# Patient Record
Sex: FEMALE | Race: WHITE
[De-identification: names, ages, dates, MRNs, and addresses within clinical notes are randomized per-mention and may not be internally consistent; named-entity substitution may affect disease eponyms.]

---

## 2017-07-31 ENCOUNTER — HOSPITAL ENCOUNTER (OUTPATIENT)
Dept: HOSPITAL 39 - GMAM | Age: 62
Discharge: HOME | End: 2017-07-31
Attending: FAMILY MEDICINE
Payer: COMMERCIAL

## 2017-07-31 DIAGNOSIS — R82.99: ICD-10-CM

## 2017-07-31 DIAGNOSIS — R82.5: Primary | ICD-10-CM

## 2017-08-03 ENCOUNTER — HOSPITAL ENCOUNTER (OUTPATIENT)
Dept: HOSPITAL 39 - CT | Age: 62
End: 2017-08-03
Attending: FAMILY MEDICINE
Payer: COMMERCIAL

## 2017-08-03 DIAGNOSIS — R05: Primary | ICD-10-CM

## 2017-08-04 NOTE — CT
EXAM DESCRIPTION:



Chest w/Contrast



CLINICAL HISTORY:



62 years,Female,COUGH



COMPARISON:



None





TECHNIQUE:



Multiple axial helical tomographic images were obtained of the

chest with IV contrast, then reconstructed in the sagittal and

coronal plane.   This exam was performed using radiation doses

that are As Low As Reasonably Achievable (ALARA). 







FINDINGS:



Lung fields are clear. No consolidations or effusions or nodules

except a 4 mm calcified granuloma in the right base.  No

pneumothoraces.



Mediastinum demonstrates no adenopathy or masses.



Heart size and pulmonary vascularity are unremarkable.



Upper abdominal organs included in the exam demonstrate an

ill-defined hyper remake region seen in the right anterior dome

of the liver measuring 1.4 cm about in size.



Soft tissues and bony elements are unremarkable.









IMPRESSION:



Unremarkable CT of the chest.  There is however a small

ill-defined area of enhancement changes seen in the right dome of

the liver. This could be an hemangioma but recommend further

evaluation with the CT scan of the liver with and without IV

contrast but with delayed images.









Electronically signed by:  Candelario Moctezuma MD  8/4/2017 9:04 AM CDT

Workstation: [id number]

## 2017-08-14 ENCOUNTER — HOSPITAL ENCOUNTER (OUTPATIENT)
Dept: HOSPITAL 39 - GMAM | Age: 62
Discharge: HOME | End: 2017-08-14
Attending: FAMILY MEDICINE
Payer: COMMERCIAL

## 2017-08-14 DIAGNOSIS — R82.99: Primary | ICD-10-CM

## 2017-09-05 ENCOUNTER — HOSPITAL ENCOUNTER (OUTPATIENT)
Dept: HOSPITAL 39 - CT | Age: 62
Discharge: HOME | End: 2017-09-05
Attending: FAMILY MEDICINE
Payer: COMMERCIAL

## 2017-09-05 DIAGNOSIS — K76.0: ICD-10-CM

## 2017-09-05 DIAGNOSIS — R93.8: ICD-10-CM

## 2017-09-05 DIAGNOSIS — R82.5: Primary | ICD-10-CM

## 2017-09-05 DIAGNOSIS — K57.30: ICD-10-CM

## 2017-09-05 NOTE — CT
EXAM DESCRIPTION: 



CT ABDOMEN AND PELVIS WITHOUT AND WITH CONTRAST



CLINICAL HISTORY: 



NONSPECIFIC FINDINGS ON EXAM OF URINE



COMPARISON: 



None Available.



TECHNIQUE: 



CT of the abdomen and pelvis are performed prior to and during IV

bolus administration of nonionic IV contrast. Oral contrast

enhancement was not utilized

This exam was performed according to our departmental

dose-optimization program, which includes automated exposure

control, adjustment of the mA and/or kV according to patient size

and/or use of iterative reconstruction technique.



FINDINGS: 

The lung bases demonstrate a tiny 3 mm calcified granuloma in the

posterior right costophrenic angle and mild scarring anteriorly.



Mild diffuse fatty replacement of the liver is present. There is

a tiny estimated less than 1 cm faintly enhancing lesion in the

lateral dome of the right lobe of the liver that is most

suspicious for a small hemangioma. Correlation with hepatic

sonography for further evaluation is recommended. The gallbladder

is normally distended without ductal dilation and a small normal

spleen is present. The pancreas normally enhances. The adrenal

glands are normal.



The kidneys normally enhance without obstruction cyst or mass.

Delayed imaging demonstrates a single collecting system on each

side with contrast distending the bladder. Vena cava and aorta

are normal without retroperitoneal or mesenteric abnormal

adenopathy



Small and large bowel caliber is normal. Diverticulosis of the

left colon is present. The uterus is anteverted without pelvic

fluid collection or adnexal mass. The bladder is incompletely

distended. The anterior abdominal wall and inguinal regions are

normal. The spine and pelvis are unremarkable.



IMPRESSION: 



1.  Very subtle subcentimeter faint enhancement at the lateral

dome of the right lobe of the liver suspicious for a small

hemangioma. Consider hepatic sonography for further evaluation.

2.  Mild diffuse fatty infiltration of the liver.

3.  Left colonic diverticulosis.

4.  Tiny calcified granuloma right posterior lung base.

5.  Incompletely distended bladder with no gross abnormality

seen.



Electronically signed by:  John Hutson MD  9/5/2017 11:46 AM

CDT Workstation: [id number]

## 2017-10-02 ENCOUNTER — HOSPITAL ENCOUNTER (OUTPATIENT)
Dept: HOSPITAL 39 - US | Age: 62
Discharge: HOME | End: 2017-10-02
Attending: FAMILY MEDICINE
Payer: COMMERCIAL

## 2017-10-02 DIAGNOSIS — K70.0: Primary | ICD-10-CM

## 2017-10-02 NOTE — US
EXAM DESCRIPTION: 

Liver, and ultrasound.



CLINICAL HISTORY: 

FATTY LIVER



COMPARISON: 

CT abdomen and pelvis 2017.



TECHNIQUE: 

Transabdominal scannin-dimensional and Doppler modes.



FINDINGS: 

The gallbladder is normal in size, shape, and echogenicity, with

no intraluminal stones or sludge. Prominent fold in the

gallbladder. No fluid around the gallbladder. No wall thickening.

1.8 mm. Common bile duct caliber is 3.4 mm which is within normal

limits. . No stones in the visualized portion of the duct. Not

tender with transducer pressure.



The liver demonstrates normal echogenicity; contour of the liver

capsule is smooth where seen. No fluid around the liver.

Intrahepatic biliary ducts are non-dilated. Craniocaudal

dimension in the mid-clavicular axis is 13.6 cm. 



Pancreas head, body, tail normal in size and echogenicity.

Pancreatic duct is not dilated. 



IMPRESSION: 

1. Liver is not enlarged and demonstrates normal echogenicity.

Normal intrahepatic ducts. No ascites. Normal ultrasound of the

pancreas.

2. Gallbladder with prominent fold but no stones or sludge. No

wall thickening or pericholecystic fluid. Normal caliber of the

common bile duct.



Electronically signed by:  Sam Bell MD  10/2/2017 7:37 PM CDT

Workstation: [id number]

## 2017-12-04 ENCOUNTER — HOSPITAL ENCOUNTER (OUTPATIENT)
Dept: HOSPITAL 39 - RAD | Age: 62
End: 2017-12-04
Attending: OBSTETRICS & GYNECOLOGY
Payer: COMMERCIAL

## 2017-12-04 DIAGNOSIS — Z12.31: Primary | ICD-10-CM

## 2017-12-04 PROCEDURE — 77063 BREAST TOMOSYNTHESIS BI: CPT

## 2018-10-24 ENCOUNTER — HOSPITAL ENCOUNTER (OUTPATIENT)
Dept: HOSPITAL 39 - CT | Age: 63
End: 2018-10-24
Attending: FAMILY MEDICINE
Payer: COMMERCIAL

## 2018-10-24 DIAGNOSIS — Z87.891: Primary | ICD-10-CM

## 2018-10-24 DIAGNOSIS — R91.1: ICD-10-CM

## 2018-10-25 NOTE — CT
Procedure:  CT LUNG SCREENING        



Exam Date:  10/24/2018



Ordering Provider:  Grover Ambrosio



Clinical Indication:  TOBACCO USE over 40 years of smoking. Quit

smoking 3 months ago This patient meets eligibility criteria for

low-dose CT lung cancer screening.



Comparison: CT scan of chest with IV contrast 8/3/2017.



Technique:  Using a multislice scanner, sequential helical axial

imaging was obtained in the thorax, 2.5 mm thickness, 2.5 mm

separation, from the level of the thoracic inlet through the lung

bases without IV contrast. A low dose protocol was utilized:

CTDI: 1.76 mGy.  120. kVp.  45 mA.  2D sagittal and coronal

reconstructed images, 6.0 mm thickness, were obtained. This exam

was performed according to our departmental dose optimization

program which includes use of automated exposure control,

adjustment of the mA and/or kV according to patient size and/or

use of iterative reconstruction technique.



FINDINGS: 

Lungs and large airways: 4 mm semisolid nodule, with possible

calcification, in the upper right apex most likely associated

with the pleura. Stable since the prior study. Scarring in the

inferior lateral right middle lobe extending from the major

fissure to the lateral pleura. 6 mm calcified nodule in the

posterior recess of the right lower lobe on axial image 102,

stable since the prior study. No abnormal nodules in the left

lung. No infiltrate or mass bilaterally.



Pleura: No abnormal pleural thickening or effusion. No

pneumothorax.



Mediastinum and madi: evaluation limited by low dose technique

and lack of IV contrast.  No large lymph nodes or soft tissue

masses.



Heart and great vessels: Normal contour. No atherosclerotic

calcification.



Chest wall, lower neck, axillae: Evaluation also limited by same

factors as described above.   No soft tissue masses or abnormal

calcifications.



Upper abdomen: Included peritoneal cavity with no free fluid.

Normal size and density of the spleen. Gallbladder partially

visualized.



Bones: Evaluation limited by screening MIP technique.  Minimal

spondylosis at some levels of the thoracic spine. No lytic or

blastic lesions.



IMPRESSION:

Small nodule affiliated with the apical pleura in the right upper

lobe is stable. Stable granulomatous nodule with calcification in

the posterior recess of the right lower lobe.  Please see below

for Lung RADS category and FOLLOW-UP.*



*Lung RADS category Category 2 - Nodules with a very low

likelihood (less than 1%) of becoming a clinically active cancer

due to size or lack of growth. 



Nodules: Solid or part solid nodule(s) less than 6mm, new solid

nodule less than 4mm. 



Follow-up:  Continue annual screening with a Low Dose Chest CT in

12 months for re-evaluation. 



Electronically signed by:  Sam Bell MD  10/25/2018 8:47 AM

CDT Workstation: [id number]

## 2018-12-10 ENCOUNTER — HOSPITAL ENCOUNTER (OUTPATIENT)
Dept: HOSPITAL 39 - MAMMO | Age: 63
End: 2018-12-10
Attending: FAMILY MEDICINE
Payer: COMMERCIAL

## 2018-12-10 DIAGNOSIS — Z12.31: Primary | ICD-10-CM

## 2018-12-11 NOTE — MAM
EXAM DESCRIPTION: 

3D Screening BILATERAL : Digital Mammography.



CLINICAL HISTORY: 

63 years Female SCREEN . No complaints. No personal or family

history of breast cancer. Childbirth. Postmenopausal 20 years.

Currently on HRT.  Lifetime risk of developing breast cancer

(Tyrer-Cuzick model)(%):  7.0.



COMPARISON: 

Bilateral screening digital breast tomosynthesis 12/4/2017  



TECHNIQUE: 

Bilateral CC and MLO projection full-field images, digital

tomosynthesis mammographic technique. Bilateral digital 2-D

full-field MLO images. CAD not available for tomosynthesis or 2-D

images.  



FINDINGS: 

The breast parenchymal density pattern is: Scattered areas of

fibroglandular density. No skin thickening or nipple retraction. 

No new focal, stellate mass or density, focal asymmetry , and no

suspicious microcalcifications bilaterally. Stable mammograms

compared to prior study.  Taking into account, differences in

mammographic technique.



IMPRESSION: 

BI-RADS CATEGORY: 1 - NEGATIVE

FOLLOW UP: Routine digital bilateral screening, one year interval

from December 2018.



Written communication explaining the findings and follow-up, will

be mailed to the patient and referring health care provider.



According to the [employer], yearly mammograms

are recommended starting at age 40 and continuing as long as a

woman is in good health.  Any breast change noted on a breast

self-exam should be reported promptly to the patient's healthcare

provider.  Breast MRI is recommended for women with an

approximately 20-25% or greater lifetime risk of breast cancer,

including women with a strong family history of breast or ovarian

cancer and women who have been treated for Hodgkin's disease.



A negative mammographic report should not delay tissue diagnosis

in patients with significant clinical history or physical

findings.



Extremely dense breast tissue limits the sensitivity of digital

mammography.



Electronically signed by:  Sam Bell MD  12/11/2018 2:13 PM

[facility] Workstation: [id number]

## 2019-04-29 ENCOUNTER — HOSPITAL ENCOUNTER (OUTPATIENT)
Dept: HOSPITAL 39 - GMAJS | Age: 64
End: 2019-04-29
Attending: PHYSICIAN ASSISTANT
Payer: COMMERCIAL

## 2019-04-29 DIAGNOSIS — F41.1: ICD-10-CM

## 2019-04-29 DIAGNOSIS — R06.02: Primary | ICD-10-CM
